# Patient Record
Sex: FEMALE | Race: WHITE | NOT HISPANIC OR LATINO | URBAN - METROPOLITAN AREA
[De-identification: names, ages, dates, MRNs, and addresses within clinical notes are randomized per-mention and may not be internally consistent; named-entity substitution may affect disease eponyms.]

---

## 2017-02-10 ENCOUNTER — COMMUNICATION - RIVER FALLS (OUTPATIENT)
Dept: FAMILY MEDICINE | Facility: CLINIC | Age: 62
End: 2017-02-10

## 2017-02-10 ENCOUNTER — OFFICE VISIT - RIVER FALLS (OUTPATIENT)
Dept: FAMILY MEDICINE | Facility: CLINIC | Age: 62
End: 2017-02-10

## 2017-02-10 ASSESSMENT — MIFFLIN-ST. JEOR: SCORE: 1529.5

## 2017-02-11 LAB
CHOLEST SERPL-MCNC: 237 MG/DL (ref 125–200)
CHOLEST/HDLC SERPL: 5.3 {RATIO}
CREAT SERPL-MCNC: 0.87 MG/DL (ref 0.5–0.99)
GLUCOSE BLD-MCNC: 97 MG/DL (ref 65–99)
HBA1C MFR BLD: 5.8 %
HDLC SERPL-MCNC: 45 MG/DL
LDLC SERPL CALC-MCNC: 156 MG/DL
NONHDLC SERPL-MCNC: 192 MG/DL
TRIGL SERPL-MCNC: 180 MG/DL

## 2017-06-14 ENCOUNTER — OFFICE VISIT - RIVER FALLS (OUTPATIENT)
Dept: FAMILY MEDICINE | Facility: CLINIC | Age: 62
End: 2017-06-14

## 2017-06-14 ASSESSMENT — MIFFLIN-ST. JEOR: SCORE: 1517.7

## 2018-01-03 ENCOUNTER — OFFICE VISIT - RIVER FALLS (OUTPATIENT)
Dept: FAMILY MEDICINE | Facility: CLINIC | Age: 63
End: 2018-01-03

## 2018-01-03 ASSESSMENT — MIFFLIN-ST. JEOR: SCORE: 1515.89

## 2018-10-01 ENCOUNTER — OFFICE VISIT - RIVER FALLS (OUTPATIENT)
Dept: FAMILY MEDICINE | Facility: CLINIC | Age: 63
End: 2018-10-01

## 2018-10-01 ASSESSMENT — MIFFLIN-ST. JEOR: SCORE: 1517.7

## 2018-10-28 ENCOUNTER — OFFICE VISIT - RIVER FALLS (OUTPATIENT)
Dept: FAMILY MEDICINE | Facility: CLINIC | Age: 63
End: 2018-10-28

## 2018-10-28 ASSESSMENT — MIFFLIN-ST. JEOR: SCORE: 1522.24

## 2022-02-11 VITALS
BODY MASS INDEX: 27.3 KG/M2 | HEIGHT: 71 IN | SYSTOLIC BLOOD PRESSURE: 124 MMHG | DIASTOLIC BLOOD PRESSURE: 80 MMHG | WEIGHT: 195 LBS | HEART RATE: 83 BPM | TEMPERATURE: 98.1 F

## 2022-02-11 VITALS
TEMPERATURE: 97.9 F | WEIGHT: 195 LBS | TEMPERATURE: 97.7 F | SYSTOLIC BLOOD PRESSURE: 119 MMHG | OXYGEN SATURATION: 95 % | SYSTOLIC BLOOD PRESSURE: 108 MMHG | BODY MASS INDEX: 27.44 KG/M2 | HEART RATE: 83 BPM | HEIGHT: 71 IN | HEART RATE: 81 BPM | BODY MASS INDEX: 27.3 KG/M2 | HEIGHT: 71 IN | WEIGHT: 196 LBS | DIASTOLIC BLOOD PRESSURE: 76 MMHG | DIASTOLIC BLOOD PRESSURE: 70 MMHG

## 2022-02-11 VITALS
WEIGHT: 194.6 LBS | DIASTOLIC BLOOD PRESSURE: 58 MMHG | HEIGHT: 71 IN | SYSTOLIC BLOOD PRESSURE: 108 MMHG | TEMPERATURE: 97.8 F | HEART RATE: 78 BPM | BODY MASS INDEX: 27.24 KG/M2

## 2022-02-11 VITALS
HEART RATE: 74 BPM | BODY MASS INDEX: 27.66 KG/M2 | TEMPERATURE: 98.1 F | DIASTOLIC BLOOD PRESSURE: 82 MMHG | HEIGHT: 71 IN | WEIGHT: 197.6 LBS | SYSTOLIC BLOOD PRESSURE: 114 MMHG

## 2022-02-16 NOTE — PROGRESS NOTES
Chief Complaint    Pt c/o sinus infection and right ear pain with discharge.  History of Present Illness      Chief complaint as above reviewed and confirmed with patient.  Pt presents to the clinic with concerns re: sinus infecton and R ear pain.  She states has had 2 + weeks of rhinorrhea, congestion, thick yellow discharge. sx are worsening and now with facial pressure, pain.  no nausea, vomiting.  R ear was painful x 2 days but better now but noted purulent discharge coming out R ear this am.  Review of Systems      Review of systems is negative with the exception of those noted in HPI          Physical Exam   Vitals & Measurements    T: 97.7   F (Tympanic)  HR: 81(Peripheral)  BP: 119/76     HT: 70.5 in  WT: 195 lb  BMI: 27.58           Vitals as above per nursing documentation           Constitutional : nad appears well          Ears: ears patent B, TMS intact, noninjected, R canal has bright red blood sitting in katelynn canal. I am unable to see any perforation but could be along the periphery.           Nose: nasal mucosa is ededmatous. mucopurulent discharge           Throat: pharynx is nonerythematous, no tonsillar hypertrophy, no exudate           Neck: neck supple, no adenopathy, no thyromegaly, no rigidity           Lungs: lungs CTA', no Wheezes, rhonchi or rales           Heart: heart RRR, nl S1, S2 no murmur           skin:  No rashes              Assessment/Plan       1. Sinusitis (J32.9)         amoxil as ordered. Push fluids, rest and ibuprofen or tylenol for comfort.  Pt instructed to return to clinic for persistent or worsening symptoms.                    Orders:          00784 office outpatient visit 15 minutes (Charge), Quantity: 1, Sinusitis  Otorrhea of right ear                2. Otorrhea of right ear (H92.11)         floxin otic,  likely rupture given hx of purulent dischage and blood in the canal but unable to see peforation.  may be along the periphery.         Orders:          [id number] office  outpatient visit 15 minutes (Charge), Quantity: 1, Sinusitis  Otorrhea of right ear                Orders:         amoxicillin, = 1 tab(s) ( 875 mg ), PO, BID, # 20 tab(s), 0 Refill(s), Type: Maintenance, Pharmacy: [id number], 1 tab(s) Oral bid,x10 day(s), (Ordered)         ofloxacin otic, 5 drop(s), Both ears, BID, # 5 mL, 0 Refill(s), Type: Maintenance, Pharmacy: [id number], 5 drop(s) Ear-Both bid,x7 day(s), (Ordered)         polymyxin B-trimethoprim ophthalmic, 1 drop(s), Both eyes, q3hr, # 10 mL, 0 Refill(s), Type: Maintenance, Pharmacy: [id number], 1 drop(s) Eye-Both q3 hr (int),x10 day(s), (Ordered)  Patient Information     Name:JUJU HASSAN      Address:      [address] [place] [address] [place] [address]     Sex:Female     YOB: 1955     [phone]     Emergency Contact:CRUZITO HASSAN     MRN:[id number]     FIN:[id number]     Location:[facility]     Date of Service:10/01/2018      Primary Care Physician:       NONE ,       Attending Physician:       Amara Avalos PA-C, [phone]  Problem List/Past Medical History    Ongoing     No qualifying data    Historical     Pregnancy     Pregnancy     Pregnancy  Procedure/Surgical History     Oophorectomy (1997)        Medications     Polytrim 10,000 units-1 mg/mL ophthalmic solution: 1 drop(s), Both eyes, q3hr, for 10 day(s), 10 mL, 0 Refill(s).     amoxicillin 875 mg oral tablet: 875 mg, 1 tab(s), PO, BID, for 10 day(s), 20 tab(s), 0 Refill(s).     Floxin Otic 0.3% otic solution: 5 drop(s), Both ears, BID, for 7 day(s), 5 mL, 0 Refill(s).          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 Spouse/Partner name: Joshua Lehman., 03/15/2017     Substance Abuse - Denies Substance Abuse, 03/15/2017      Never, 03/15/2017     Tobacco - Denies Tobacco Use, 03/15/2017      Never smoker, 03/15/2017  Family History    Mother: History is unknown    Son: History is unknown    Sister: History is negative    Sister: History is negative    Sister: History is negative    Daughter: History is unknown    Son: History is unknown  Immunizations      Vaccine Date Status      influenza virus vaccine, inactivated 10/25/2016 Recorded      tetanus/diphth/pertuss (Tdap) adult/adol 08/12/2010 Recorded      influenza, H1N1, inactivated 01/14/2010 Recorded      influenza virus vaccine, inactivated 09/23/2009 Recorded

## 2022-02-16 NOTE — PROGRESS NOTES
Patient:   JUJU HASSAN            MRN: [id number]            FIN: [id number]               Age:   61 years     Sex:  Female     :  1955   Associated Diagnoses:   FH: heart disease; FH: type 2 diabetes; Vaginitis; Well woman exam   Author:   Nat Arzola      Visit Information      Date of Service: 02/10/2017 03:51 pm  Performing Location: [facility]  Encounter#: [id number]      Primary Care Provider (PCP):  JOSE -UNKNOWN,      Chief Complaint   2/10/2017 3:55 PM CST    Patient presents for yearly px. No concerns.      Well Adult History     Has never had abnormal pap smear  monogamous relationship, no previous STD hx  mammogram: 2017  has never had colonoscopy and would like to do this but may need to be done at [facility] d/t insurance  she is an RN in [employer]  PHQ9=0      Review of Systems   Constitutional:  Negative.    Eye:  Negative, sees eye doctor annually  wears readers  had cataracts removed from both eyes.    Ear/Nose/Mouth/Throat:  Negative, sees dentist at least annually.    Respiratory:  Negative.    Cardiovascular:  Negative, ftr had MI.    Breast:       Nipple discharge: did bresat feed her children.    Gastrointestinal:  Negative.    Genitourinary:  Negative.    Gynecologic:  Negative.    Hematology/Lymphatics:  Negative.    Endocrine:  Negative.    Immunologic:  Negative.    Musculoskeletal:  Negative.    Integumentary:  Negative.    Neurologic:  Negative, occasional headches.    Psychiatric:  Negative.             Health Status   Allergies:    Allergic Reactions (Selected)  No Known Medication Allergies      Histories   Family History:    No family history items have been selected or recorded.   Procedure history:    No active procedure history items have been selected or recorded., lef ovary removed 25 yrs ago   Social History:             No active social history items have been recorded.      Physical Examination   Vital Signs   2/10/2017 3:55 PM CST  Temperature Tympanic 98.1 DegF    Peripheral Pulse Rate 74 bpm    Systolic Blood Pressure 114 mmHg    Diastolic Blood Pressure 82 mmHg    Mean Arterial Pressure 93 mmHg    BP Site Right arm    BP Method Manual      Measurements from flowsheet : Measurements   2/10/2017 3:55 PM CST Height Measured - Standard 70.5 in    Weight Measured - Standard 197.6 lb    BSA 2.11 m2    Body Mass Index 27.95 kg/m2      General:  Alert and oriented, No acute distress.    Eye:  Pupils are equal, round and reactive to light, Intact accommodation, Normal conjunctiva, Vision unchanged.         Periorbital area: Within normal limits.    HENT:  Normocephalic, Tympanic membranes are clear, Normal hearing, Oral mucosa is moist, No pharyngeal erythema, No sinus tenderness.    Neck:  Supple, Non-tender, No lymphadenopathy, No thyromegaly.    Respiratory:  Lungs are clear to auscultation, Respirations are non-labored, No chest wall tenderness.    Cardiovascular:  Normal rate, Regular rhythm, No murmur, No edema.    Breast:  No mass, No tenderness.    Gastrointestinal:  Soft, Non-tender, Non-distended, Normal bowel sounds, No organomegaly.    Genitourinary:  No costovertebral angle tenderness.         Labia: Bilateral, Within normal limits.         Mons pubis: WNL.         Perineum: Within normal limits.         Vulva: Within normal limits.         Urethra: Within normal limits.         Cervix: Within normal limits.         Uterus: Within normal limits.         Adnexa: Bilateral, Within normal limits.         Ovaries: Right, Within normal limits, left surgical absent.    Lymphatics:  No lymphadenopathy neck, axilla, groin.    Musculoskeletal:  Normal range of motion, Normal strength, No swelling.    Integumentary:  Warm, Dry, Pink.    Neurologic:  Alert, Oriented.    Psychiatric:  Cooperative, Appropriate mood & affect.       Review / Management   Results review:  hgba1c, lipid, hgb, bmp ordered.       Impression and Plan   Diagnosis     FH:  heart disease (XGA06-HF Z82.49).     FH: type 2 diabetes (EXD66-MO Z83.3).     Vaginitis (PTH76-ZP N76.0).     Well woman exam (BNP54-QT Z01.419).     Patient Instructions:       Counseled: Patient, Regarding diagnosis, Regarding medications, Verbalized understanding.    Summary:  mammogram done  pap collected  calcium intake 1200mg daily with 400-600 IU vit D  exercise and good nutrition encouraged  would like her to have screening colonoscopy, she will check with insurance and may need to have this done at [facility]

## 2022-02-16 NOTE — PROGRESS NOTES
Patient:   JUJU LEHMAN            MRN: [id number]            FIN: [id number]               Age:   61 years     Sex:  Female     :  1955   Associated Diagnoses:   Injury of right toe   Author:   Kory Contreras MD      Chief Complaint   2017 3:33 PM CDT    Pt c/o pain in right foot. Hit her toe on a safe.        History of Present Illness   CC as above and reviewed w  patient         The patient presents following a direct blow.  The incident occurred 1 day(s) ago.  The location of the injury is the right, foot.  The injury is characterized by swelling.  The severity of the symptom(s) associated to the specific injury is moderate.        Review of Systems            Health Status   Allergies:    Allergic Reactions (Selected)  No Known Medication Allergies      Histories   Past Medical History:    Resolved  Pregnancy (313166143):  Resolved in  at 36 years.  Pregnancy (107988511):  Resolved in  at 39 years.  Pregnancy (110517945):  Resolved in  at 42 years.   Family History:       Procedure history:    Oophorectomy (725567345) in  at 42 Years.   Social History:        Alcohol Assessment: Denies Alcohol Use            Never      Tobacco Assessment: Denies Tobacco Use            Never smoker      Substance Abuse Assessment: Denies Substance Abuse            Never      Employment and Education Assessment            Employed, Work/School description: RN.      Home and Environment Assessment            Marital status: .  Spouse/Partner name: Joshua Lehman.      Exercise and Physical Activity Assessment: Occasional exercise            Exercise type: Walking.        Physical Examination   Vital Signs   2017 3:33 PM CDT Temperature Tympanic 98.1 DegF    Peripheral Pulse Rate 83 bpm    Systolic Blood Pressure 124 mmHg    Diastolic Blood Pressure 80 mmHg    Mean Arterial Pressure 95 mmHg      Measurements from flowsheet : Measurements   2017 3:33 PM CDT Height Measured - Standard 70.5  in    Weight Measured - Standard 195 lb    BSA 2.1 m2    Body Mass Index 27.58 kg/m2      Musculoskeletal:  swollen and bruised r 5th toe.       Review / Management   Radiology results   X-ray, Reveals no acute disease process      Impression and Plan   Diagnosis     Injury of right toe (TPS56-GG S99.921A).     Course:  Unchanged.    Orders     Orders (Selected)   Outpatient Orders  Ordered  XR Toes Right (Request): Toe fracture  Completed   ambulatory surgical boot eac (Charge): Quantity: 1, Toe fracture.     Summary:  Toe sprain will jazmine tape and have her wear shoe for comfort..

## 2022-02-16 NOTE — PROGRESS NOTES
Patient:   JUJU HASSAN            MRN: [id number]            FIN: [id number]               Age:   62 years     Sex:  Female     :  1955   Associated Diagnoses:   Left ankle sprain   Author:   Eleazar Johnson MD      Chief Complaint   1/3/2018 1:23 PM CST     Pt slipped on the ice a few weeks ago and rolled her left ankle. Pt still having soreness especially when walking up and down stairs.        History of Present Illness   chief complaint and symptoms as noted above confirmed with patient       Review of Systems   Constitutional:  Negative except as documented in history of present illness.    Musculoskeletal:  Negative except as documented in history of present illness.    Integumentary:  Negative.    Neurologic:  Negative.       Physical Examination   Vital Signs   1/3/2018 1:23 PM CST Temperature Tympanic 97.8 DegF  LOW    Peripheral Pulse Rate 78 bpm    Systolic Blood Pressure 108 mmHg    Diastolic Blood Pressure 58 mmHg  LOW    Mean Arterial Pressure 75 mmHg    BP Site Right arm    BP Method Manual      Measurements from flowsheet : Measurements   1/3/2018 1:23 PM CST Height Measured - Standard 70.5 in    Weight Measured - Standard 194.6 lb    BSA 2.09 m2    Body Mass Index 27.52 kg/m2  HI      General:  Alert and oriented, No acute distress.    Musculoskeletal:       Lower extremity exam: Ankle ( Left, Anterior, Lateral, No swelling, Tenderness, Normal range of motion, neg drawer, cms otherwise intact ).    Neurologic:  Alert, Oriented.       Review / Management   Radiology results   Reveals no acute disease process      Impression and Plan   Diagnosis     Left ankle sprain (CVP13-EE S93.402A).     Course:  Not well controlled.    Plan:  aircast  rice  consider pt in 1-2 weeks if no change  xray reviewed by myself and communicated to patient. I will call patient if the final reading is any different.    Patient Instructions:       Counseled: Patient, Regarding diagnosis, Regarding medications,  Activity.

## 2022-02-16 NOTE — PROCEDURES
Accession Number:       [id number]  CLINICAL INFORMATION::     Normal exam  LMP::     NONE GIVEN  PREV. PAP::     NONE GIVEN  PREV. BX::     NONE GIVEN  SOURCE::     Cervix, Endocervix  STATEMENT OF ADEQUACY::     Satisfactory for evaluation. Endocervical/transformation zone component present.  INTERPRETATION/RESULT::     Negative for intraepithelial lesion or malignancy.  COMMENT::     This Pap test has been evaluated with computer assisted technology.  CYTOTECHNOLOGIST::     PRINCE GALVEZ(ASCP) CT Screening location: [place] [id number]